# Patient Record
(demographics unavailable — no encounter records)

---

## 2018-06-05 NOTE — US
EXAMINATION TYPE: US abdomen complete

 

DATE OF EXAM: 6/5/2018

 

COMPARISON: Renal only

 

CLINICAL HISTORY: R10.33 PERIUMBILICAL PAIN. Pt states periumbilical pain, N&V

 

EXAM MEASUREMENTS:

 

Liver Length:  12.7 cm   

Gallbladder Wall:  0.2 cm   

CBD:  0.3 cm

Spleen:  8.5 cm   

Right Kidney:  10.9 x 4.1 x 4.9 cm 

Left Kidney:  10.0 x 5.1 x 4.3 cm   

 

 

 

Pancreas:  wnl

Liver:  wnl  

Gallbladder:  wnl

**Evidence for sonographic Street's sign:  No

CBD:  wnl 

Spleen:  wnl   

Right Kidney:  wnl   

Left Kidney:  wnl   

Upper IVC:  wnl  

Abd Aorta:  wnl

 

**No abnormality visualized within ABD at this time**

 

The liver is homogenous.  The intrahepatic portion of the IVC and proximal abdominal aorta are within
 normal limits.  There is no evidence of cholelithiasis.  Common bile duct is unremarkable.  The visu
alized portions of the pancreas are homogenous.  The spleen is unremarkable.  Kidneys are symmetric a
nd free of hydronephrosis.  No renal lesions are seen.

 

IMPRESSION: Normal abdomen ultrasound

## 2018-06-05 NOTE — US
EXAMINATION TYPE: US pelvic complete

 

DATE OF EXAM: 6/5/2018

 

COMPARISON: NONE

 

CLINICAL HISTORY: R10.33 PERIUMBILICAL PAIN. Generalized pain. Patient on menses.  

 

TECHNIQUE:  Transabdominal (TA).  

 

Date of LMP:  6/2/2018, G0

 

EXAM MEASUREMENTS:

 

Uterus:  6.0 x 3.7 x 3.3 cm

Endometrial Stripe: 0.2 cm

Right Ovary:  2.5 x 1.6 x 1.3 cm

Left Ovary:  2.9 x 1.4 x 1.4 cm

 

 

 

1. Uterus:  Anteverted   wnl

2. Endometrium:  wnl

3. Right Ovary:  wnl

4. Left Ovary:  wnl

5. Bilateral Adnexa:  wnl

6. Posterior cul-de-sac:  no free fluid

Cervix- wnl

 

 

 

IMPRESSION: No significant abnormalities evident

## 2019-01-17 NOTE — XR
EXAMINATION TYPE: XR chest 2V

 

DATE OF EXAM: 1/16/2019

 

COMPARISON: NONE

 

HISTORY: Cough for 1.5 months

 

TECHNIQUE:  Frontal and lateral views of the chest are obtained.

 

FINDINGS:  There is no focal air space opacity, pleural effusion, or pneumothorax seen.  The cardiac 
silhouette size is within normal limits.   The osseous structures are intact.

 

IMPRESSION:  No acute cardiopulmonary process.

## 2020-05-25 NOTE — P.MSEPDOC
Presenting Problems





- Arrival Data


Date of Arrival on Unit: 20


Time of Arrival on Unit: 15:55


Mode of Transport: Ambulatory





- Complaint


OB-Reason for Admission/Chief Complaint: Possible Onset of Labor


Comment: states started having contx at 1000, getting stronger through day now 

stronger and stronger. intercourse during that time. denies leaking fluid or 

bleeding vaginally





Prenatal Medical History





- Pregnancy Information


: 2


Para: 0


Term: 0


: 0


Abortions: Spontaneous or Elective: 1


Number of Living Children: 0





- Gestational Age


Gestational Age by JACIEL (wks/days): 35 Weeks and 4 Days





- Prenatal History


Pregnancy Complications: Smoker, Other


Comment: gbs unknown not done yet.





Review of Systems





- Review of Systems


Constitutional: No problems


Breast: No problems


ENT: No problems


Cardiovascular: No problems


Respiratory: No problems


Gastrointestinal: No problems


Genitourinary: No problems


Musculoskeletal: No problems


Neurological: No problems


Skin: No problems





Vital Signs





- Temperature


Temperature: 98.0 F


Temperature Source: Temporal Artery Scan





- Pulse


  ** Right Radial


Pulse Rate: 125


Pulse Assessment Method: Auscultation





- Respirations


Respiratory Rate: 16


Oxygen Delivery Method: Room Air


O2 Sat by Pulse Oximetry: 98





- Blood Pressure


  ** Right Arm


Blood Pressure: 104/72


Blood Pressure Mean: 82


Blood Pressure Source: Automatic Cuff





Medical Screen Scoring (Pre)





- Cervical Exam


Dilation: 1-3 cm = 1


Membranes: Intact





- Uterine Contractions


Frequency: < 36 weeks = 6


Duration: > 40 seconds = 2


Intensity: N/A





- Maternal Vital Signs


Maternal Temperature: N/A


Maternal Blood Pressure: N/A


Signs of Preeclampsia: N/A


Maternal Respirations: N/A





- Maternal Trauma


Maternal Trauma: N/A





- Fetal Assessment - Baby A


Baseline FHR: 140


Fetal Heart Rate - NICHD Category: Category I (Normal) = 0


NST: Reactive


Fetal Station: N/A





- Total Score - Baby A


Total Score - Baby A: 9





- Total Score - Baby B


Total Score - Baby B: 9





- Total Score - Baby C


Total Score - Baby C: 9





- Level of Risk - Baby A


Level of Risk - Baby A: Medium (6-9)





- Level of Risk - Baby B


Level of Risk - Baby B: Medium (6-9)





- Level of Risk - Baby C


Level of Risk - Baby C: Medium (6-9)





Physician Notification (Pre)





- Physician Notified


Physician Notified Date: 20


Physician Notified Time: 16:20


New Order Received: Yes





- Notification Comment


Comment: iv hydrate l/r. cbc and urinalsys. recheck 1 hour and call report





Disposition





- Disposition


OB Disposition: Discharge to home


I agree with the RN Medical Screening Exam: Yes


Risk & Benefit of care provided described in d/c instruction: Yes


Diagnosis:  LABOR WITHOUT DELIVERY, THIRD TRIMESTER

## 2020-05-25 NOTE — P.HPOB
History of Present Illness


H&P Date: 20


Chief Complaint: Contractions





This patient is a 23-year-old  2 para 0 female estimated date of 

confinement 2020 estimated gestational age 35-4/7 weeks which is changed 

by a 9 week ultrasound who presents to labor and delivery with complaints of 

contractions since about 10:00 this morning.  Patient states that she had 

intercourse this morning at about that time began having back pain and 

contractions thereafter.  Prenatal care is per Dr. Ferguson and it appears to be 

otherwise uncomplicated except for the history of a positive Ureaplasma culture.





Review of Systems


Genitourinary: Reports pregnant


Menstruation: Reports amenorrhea





Past Medical History


Past Medical History: GERD/Reflux


History of Any Multi-Drug Resistant Organisms: None Reported


Past Anesthesia/Blood Transfusion Reactions: No Reported Reaction


Past Psychological History: No Psychological Hx Reported


Smoking Status: Former smoker


Past Alcohol Use History: None Reported


Past Drug Use History: None Reported





Medications and Allergies


                                Home Medications











 Medication  Instructions  Recorded  Confirmed  Type


 


Citalopram Hydrobromide [CeleXA] 20 mg PO DAILY 20 History


 


Doxylamine Succinate [Unisom] 25 mg PO DAILY 20 History


 


Iron 18 mg PO DAILY 20 History


 


Omeprazole [PriLOSEC] 1 tab OP DAILY 20 History


 


Pnv,Calcium 72/Iron/Folic Acid 1 tab PO DAILY 20 History





[Prenatal Plus Tablet]    








                                    Allergies











Allergy/AdvReac Type Severity Reaction Status Date / Time


 


ibuprofen [From Motrin] Allergy  Anaphylaxis Verified 20 15:47


 


Penicillins Allergy  Nausea & Verified 20 15:47





   Vomiting  














Exam


                                   Vital Signs











  Temp Pulse Resp BP Pulse Ox


 


 20 15:59  98.0 F  125 H  16  104/72  98








                                Intake and Output











 20





 06:59 14:59 22:59


 


Other:   


 


  Weight   84.822 kg














- OBG Physical Exam


Abdomen: bowel sounds normal, no diffuse tenderness, no bruit present, no 

guarding noted, no hepatomegaly, no splenomegaly, no mass


Vulva: both: normal


Vagina: normal moisture, no discharge


Cervix: no lesion (Cervix is 2-3, soft but posterior.), no discharge


Uterus: enlarged





Results





Prenatal blood work shows she is B+, rubella immune, RPR is nonreactive, 

hepatitis B is negative, HIV is nonreactive, group B strep has not been done 

yet, Glucola was normal, ultrasounds at 32 weeks was normal


Result Diagrams: 


                                 20 16:45





                  Abnormal Lab Results - Last 24 Hours (Table)











  20 Range/Units





  16:35 16:45 


 


WBC   13.0 H  (3.8-10.6)  k/uL


 


RBC   3.59 L  (3.80-5.40)  m/uL


 


Hgb   10.8 L  (11.4-16.0)  gm/dL


 


Hct   33.5 L  (34.0-46.0)  %


 


Neutrophils #   10.3 H  (1.3-7.7)  k/uL


 


Urine Appearance  Cloudy H   (Clear)  


 


Urine Protein  1+ H   (Negative)  


 


Urine Blood  Moderate H   (Negative)  


 


Ur Leukocyte Esterase  Large H   (Negative)  


 


Urine RBC  24 H   (0-5)  /hpf


 


Urine WBC  >182 H   (0-5)  /hpf


 


Urine WBC Clumps  Few H   (None)  /hpf


 


Ur Squamous Epith Cells  16 H   (0-4)  /hpf


 


Hyaline Casts  3 H   (0-2)  /lpf


 


Urine Mucus  Many H   (None)  /hpf














Assessment and Plan


Assessment: 





This is a 23-year-old  2 para 0 female 35-4/7 weeks gestation with 

 contractions and cervical dilation.  Since this patient is less than 37 

weeks and continues to have contractions despite IV hydration, I'm going to 

admit her for Celestone therapy in the event she does go on to deliver.  If the 

patient does go into labor we will also give her IV antibiotics due to unknown 

group B strep status and prematurity.  Fetal heart tones are category 1 at this 

time.  I did do a bedside ultrasound confirms vertex presentation.  Plan is 

continued inpatient hospitalization.  I discussed this plan with the patient and

her partner and they understand and agree.


(1) 35 to 36 weeks gestation of pregnancy


Current Visit: Yes   Status: Acute   Code(s): JSI1470 -    SNOMED Code(s): 

124176233


   





(2)  contractions


Current Visit: Yes   Status: Acute   Code(s): O47.9 - FALSE LABOR, UNSPECIFIED  

SNOMED Code(s): 329129626

## 2020-05-26 NOTE — P.DS
Providers


Date of admission: 


05/25/20 17:42





Expected date of discharge: 05/26/20


Attending physician: 


Adolfo Cat





Primary care physician: 


Adolfo Cat





[facility] Course: 





Patient is doing well this morning.  Her contractions decreased significantly.  

She is requesting discharge home.  Prescription for Keflex and omeprazole are 

provided.  She'll follow up with me this week.  She is aware to maintain pelvic 

rest for next few days.  She will return this evening for a second dose of 

Celestone.  All the questions are answered for this time.  Her vital signs are 

stable and afebrile.  Heart regular, lungs clear, extremities without pain.  

Abdomen soft and few contractions are noted.  A category 1 tracing has been 

noted.


Patient Condition at Discharge: Good





Plan - Discharge Summary


New Discharge Prescriptions: 


New


   Omeprazole [PriLOSEC] 40 mg PO DAILY 30 Days #30 cap


   Cephalexin [Keflex] 500 mg PO Q6HR #20 cap





No Action


   Omeprazole [PriLOSEC] 1 tab OP DAILY


   Citalopram Hydrobromide [CeleXA] 20 mg PO DAILY


   Iron 18 mg PO DAILY


   Pnv,Calcium 72/Iron/Folic Acid [Prenatal Plus Tablet] 1 tab PO DAILY


   Doxylamine Succinate [Unisom] 25 mg PO DAILY


Discharge Medication List





Citalopram Hydrobromide [CeleXA] 20 mg PO DAILY 05/25/20 [History]


Doxylamine Succinate [Unisom] 25 mg PO DAILY 05/25/20 [History]


Iron 18 mg PO DAILY 05/25/20 [History]


Omeprazole [PriLOSEC] 1 tab OP DAILY 05/25/20 [History]


Pnv,Calcium 72/Iron/Folic Acid [Prenatal Plus Tablet] 1 tab PO DAILY 05/25/20 

[History]


Cephalexin [Keflex] 500 mg PO Q6HR #20 cap 05/26/20 [Rx]


Omeprazole [PriLOSEC] 40 mg PO DAILY 30 Days #30 cap 05/26/20 [Rx]








Follow up Appointment(s)/Referral(s): 


Samuel Ferguson DO [Doctor of Osteopathic Medicine] - 1 Week


Activity/Diet/Wound Care/Special Instructions: 


Return with any contractions, leaking of fluid or bleeding.  She will return 

tonight for a second dose of Celestone as she is only 35 weeks gestation.


Discharge Disposition: HOME SELF-CARE

## 2020-06-15 NOTE — P.HPOB
History of Present Illness


H&P Date: 06/15/20


Chief Complaint: SROM





23-year-old  presents at 30 weeks and 4 days with spontaneous rupture of 

membranes.  Her water broke at 2200 on 2020.  She presented to family 

birth 5-1/2 cm dilated, 90% effaced, and -2 station.  Fetal heart tones 140 with

moderate variability and reactive.





Review of Systems


All systems: negative


Constitutional: Denies chills, Denies fever


Eyes: denies blurred vision, denies pain


Ears, nose, mouth and throat: Denies headache, Denies sore throat


Cardiovascular: Denies chest pain, Denies shortness of breath


Respiratory: Denies cough


Gastrointestinal: Denies abdominal pain, Denies diarrhea, Denies nausea, Denies 

vomiting


Genitourinary: Denies dysuria, Denies hematuria


Musculoskeletal: Denies myalgias


Integumentary: Denies pruritus, Denies rash


Neurological: Denies numbness, Denies weakness


Psychiatric: Denies anxiety, Denies depression


Endocrine: Denies fatigue, Denies weight change





Past Medical History


Past Medical History: GERD/Reflux


Additional Past Medical History / Comment(s): Obstetric history: First pregnancy

was a spontaneous .  This is her second pregnancy she's had prenatal 

care with Dr. Mazariegos since 9 weeks gestation.  Blood type is B+, infant is 

negative, rubella immune, Pap is B-, RPR nonreactive, HIV nonreactive.


History of Any Multi-Drug Resistant Organisms: None Reported


Past Surgical History: Tonsillectomy


Additional Past Surgical History / Comment(s): wisdom teeth


Past Anesthesia/Blood Transfusion Reactions: No Reported Reaction


Past Psychological History: No Psychological Hx Reported


Smoking Status: Former smoker


Past Alcohol Use History: None Reported


Past Drug Use History: None Reported





- Past Family History


  ** Father


History Unknown: Yes


Family Medical History: Hypertension





Medications and Allergies


                                Home Medications











 Medication  Instructions  Recorded  Confirmed  Type


 


Citalopram Hydrobromide [CeleXA] 20 mg PO DAILY 05/25/20 06/15/20 History


 


Doxylamine Succinate [Unisom] 25 mg PO DAILY 05/25/20 06/15/20 History


 


Iron 18 mg PO DAILY 05/25/20 06/15/20 History


 


Pnv,Calcium 72/Iron/Folic Acid 1 tab PO DAILY 05/25/20 06/15/20 History





[Prenatal Plus Tablet]    


 


Omeprazole [PriLOSEC] 40 mg PO DAILY 30 Days #30 cap 05/26/20 06/15/20 Rx








                                    Allergies











Allergy/AdvReac Type Severity Reaction Status Date / Time


 


ibuprofen [From Motrin] Allergy  Anaphylaxis Verified 06/15/20 02:06


 


Penicillins Allergy  Nausea & Verified 06/15/20 02:06





   Vomiting  














Exam


Osteopathic Statement: *.  No significant issues noted on an osteopathic 

structural exam other than those noted in the History and Physical/Consult.


                                   Vital Signs











  Temp Pulse Resp BP Pulse Ox


 


 06/15/20 02:08  97.1 F L  106 H  18  112/76  98








                                Intake and Output











 06/14/20 06/14/20 06/15/20





 14:59 22:59 06:59


 


Other:   


 


  Weight   86.183 kg














Heart: Regular rate and rhythm


Lungs: Clear to auscultation bilaterally


Abdomen: Soft, nontender


Extremities: Negative Homans sign





Results


Result Diagrams: 


                                 06/15/20 02:35





                  Abnormal Lab Results - Last 24 Hours (Table)











  06/15/20 Range/Units





  02:35 


 


WBC  10.9 H  (3.8-10.6)  k/uL


 


RBC  3.68 L  (3.80-5.40)  m/uL


 


Hgb  11.0 L  (11.4-16.0)  gm/dL


 


Hct  33.9 L  (34.0-46.0)  %


 


Neutrophils #  7.8 H  (1.3-7.7)  k/uL














Assessment and Plan


(1) Spontaneous rupture of amniotic membranes


Current Visit: Yes   Status: Acute   Code(s): RWB6519 -    SNOMED Code(s): 

177016912


   


Plan: 





1.  Admit to family birth place


2.  Pitocin augmentation


3.  Anticipate normal vaginal delivery

## 2020-06-15 NOTE — P.PROBDLV
Vaginal Delivery Note





- .


Vaginal Delivery Note: 





Patient progressed complete and pushed with spontaneous vaginal delivery of a 

viable female  over a third-degree perineal laceration.  During deliver 

the head midline laceration was performed as the head came out from straight 

occiput anterior position.  Once baby's head was delivered gentle lateral 

traction was done to deliver the shoulders followed by the remainder the baby.  

Mouth nares were then bulb suctioned and baby was placed on mother's abdomen 

where the umbilical cord stopped pulsating for 45 seconds prior to clamping and 

cutting.  Nursery personnel was present to assume care.  Placenta was then 

delivered intact Pitocin was added to the IV.  Local anesthetic was injected 

into the third-degree perineal laceration.  Rectal exam was performed the 

initial suturing to verify no fourth degree laceration was present and I cannot 

palpate any capsular damage.  Initially 2 deep sutures were placed to 

reapproximate the tissues over the rectum.  Then the remainder was repaired in 

usual fashion of an episiotomy.  Once this was completed another rectal exam was

performed verifying good tone and no capsular involvement.  Apgar scores and 

weight are both pending but both mother and baby are currently stable following 

delivery.

## 2020-06-16 NOTE — P.PNOBGVD
Subjective





- Subjective


Principal diagnosis: Postpartum day 1


Interval history: 





Overall Damari is doing very well.  She is involuting, voiding and she is 

tolerating her diet.  She relates that the bleeding has slowed.  In discussing 

with her she relates that the pain from her laceration is not too bad but 

cramping is significantly worse.  She is questioning if she can take naproxen.  

She had such a severe ALLERGY to Motrin it didn't even occur to me to ask about 

other NSAIDs, however she relates that she takes naproxen/Aleve at home on a 

routine basis and has taken it since she was a child and had no problems.  We'll

start her on naproxen for her cramping.  Otherwise she is doing well and we'll 

continue current care with use of stool softeners with a third-degree perineal 

laceration





Objective





- Latest Vital Signs


Latest vital signs: 


                                   Vital Signs











  Temp Pulse Resp BP Pulse Ox


 


 06/16/20 04:00  98.2 F  92  18  124/64 


 


 06/16/20 00:00  98.6 F  109 H  18  118/67 


 


 06/15/20 20:00  98.4 F  96  16  127/79  98


 


 06/15/20 18:36  97.2 F L  105 H  18  115/70 


 


 06/15/20 18:15   109 H   113/67 


 


 06/15/20 17:45  97.2 F L  88  18  117/72 


 


 06/15/20 17:30  97.5 F L  108 H  18  112/68 


 


 06/15/20 17:15  97.7 F  106 H  18  127/70 


 


 06/15/20 17:00  97.6 F  111 H  18  133/64 


 


 06/15/20 16:45  97.2 F L  121 H  18  142/67 








                                Intake and Output











 06/15/20 06/16/20 06/16/20





 22:59 06:59 14:59


 


Other:   


 


  # Voids  2 














- Exam


Lungs: bilateral: normal


Chest: Normal S1, Normal S2


Extremities: Present: normal


Abdomen: Present: normal appearance, soft


Uterus: Present: normal, firm





- Labs


Labs: 


                  Abnormal Lab Results - Last 24 Hours (Table)











  06/16/20 Range/Units





  05:40 


 


WBC  15.0 H  (3.8-10.6)  k/uL


 


RBC  2.87 L  (3.80-5.40)  m/uL


 


Hgb  8.7 L D  (11.4-16.0)  gm/dL


 


Hct  26.7 L  (34.0-46.0)  %


 


Neutrophils #  11.7 H  (1.3-7.7)  k/uL

## 2020-06-17 NOTE — P.DS
Providers


Date of admission: 


06/15/20 02:26





Expected date of discharge: 06/17/20


Attending physician: 


Samuel Ferguson





Primary care physician: 


Stated None





Hospital Course: 





Patient is doing very well post partum day 2.  She is involuting, voiding and 

tolerating her diet.  She voices no complaints.  She is had a bowel movement 

this morning and she relates that she has no pain or no difficulties having a 

bowel movement.  She is aware to have is soft of stool as possible.  Next 

several days as her third-degree laceration healing all the questions are 

answered for her at this time.


On physical exam her vital signs are stable and afebrile.  Heart regular, lungs 

clear, extremities without pain.  Abdomen soft uterus is firm and lochia is 

reported light.


Assessment postpartum day 2.  Plan discharged home follow up with me in 6 weeks.

 Prescription for Norco was forwarded to her pharmacy.


Patient Condition at Discharge: Good





Plan - Discharge Summary


New Discharge Prescriptions: 


New


   HYDROcodone/APAP 5-325MG [Norco 5-325] 1 tab PO Q4HR PRN #30 tab


     PRN Reason: Pain





No Action


   Citalopram Hydrobromide [CeleXA] 20 mg PO DAILY


   Iron 18 mg PO DAILY


   Pnv,Calcium 72/Iron/Folic Acid [Prenatal Plus Tablet] 1 tab PO DAILY


   Doxylamine Succinate [Unisom] 25 mg PO DAILY


   Omeprazole [PriLOSEC] 40 mg PO DAILY 30 Days #30 cap


Discharge Medication List





Citalopram Hydrobromide [CeleXA] 20 mg PO DAILY 05/25/20 [History]


Doxylamine Succinate [Unisom] 25 mg PO DAILY 05/25/20 [History]


Iron 18 mg PO DAILY 05/25/20 [History]


Pnv,Calcium 72/Iron/Folic Acid [Prenatal Plus Tablet] 1 tab PO DAILY 05/25/20 

[History]


Omeprazole [PriLOSEC] 40 mg PO DAILY 30 Days #30 cap 05/26/20 [Rx]


HYDROcodone/APAP 5-325MG [Norco 5-325] 1 tab PO Q4HR PRN #30 tab 06/17/20 [Rx]








Follow up Appointment(s)/Referral(s): 


Samuel Ferguson DO [Doctor of Osteopathic Medicine] - 6 Weeks


Activity/Diet/Wound Care/Special Instructions: 


No heavy lifting, insertion driving and pelvic rest.  If any high temperatures, 

heavy bleeding, or severe pain call my office

## 2020-06-18 NOTE — CDI
Documentation Clarification Form

Date: 20

From: Kiera Florez

Phone: If you have a question about this query, please contact Khushbu Petty, 
 at [phone] between 8am and 5pm.

Admit Date: 6/15/20

Discharge Date: 20

Patient Name: NIESHA CARRASCO

Visit Number: [id number]



ATTENTION: The Clinical Documentation Specialists (CDI) and [facility] Coding Staff 
appreciate your assistance in clarifying documentation. Please respond to the 
clarification below the line at the bottom and electronically sign. The CDI & 
[facility] Coding staff will review the response and follow-up if needed. Please note: 
Queries are made part of the Legal Health Record. If you have any questions, 
please contact the author of this message via ITS.



Dear Dr. Samuel Ferguson,



A pregnant woman, 39 weeks gestation admitted in active labor. She had an 
episotomy with manually assisted vaginal delivery of a normal . The 
patient suffered a partial third degree laceration which was repaired.  



In your professional opinion, did the third degree laceration involve:



*Injury to perineal skin only

*Injury to perineum involving perineal muscles but not involving anal sphincter

*Injury to perineum involving anal sphincter complex

*Less than 50% of external anal sphincter (EAS) thickness torn

*More than 50% of external anal sphincter (EAS) thickness torn

*Both external anal sphincter (EAS) and internal anal sphincter (IAS) torn

*Injury to perineum involving anal sphincter complex (external anal sphincter 
(EAS) and internal anal sphincter (IAS) and anal epithelium

*Other, please specify ________

*Clinically unable to determine

___________________________________________________________________________

firstly this was NOT an episotomy. I would say injury to perineum involving 
perineal muscles but not involving anal sphincter

MTDD

## 2020-06-24 NOTE — P.MSEPDOC
Presenting Problems





- Arrival Data


Date of Arrival on Unit: 20


Time of Arrival on Unit: 18:06


Mode of Transport: Ambulatory





- Complaint


OB-Reason for Admission/Chief Complaint: Possible Onset of Labor





Prenatal Medical History





- Pregnancy Information


: 2


Para: 0


Term: 0


: 0


Abortions: Spontaneous or Elective: 1


Number of Living Children: 0





- Gestational Age


Gestational Age by JACIEL (wks/days): 38 Weeks and 1 Days





Review of Systems





- Review of Systems


Constitutional: No problems


Breast: No problems


ENT: No problems


Cardiovascular: No problems


Respiratory: No problems


Gastrointestinal: No problems


Genitourinary: No problems


Musculoskeletal: No problems


Neurological: No problems


Skin: No problems





Vital Signs





- Temperature


Temperature: 98.1 F


Temperature Source: Temporal Artery Scan





- Pulse


  ** Right


Pulse Rate: 102


Pulse Assessment Method: Automatic Cuff





- Respirations


Respiratory Rate: 16


Oxygen Delivery Method: Room Air


O2 Sat by Pulse Oximetry: 97





- Blood Pressure


  ** Right Arm


Blood Pressure: 123/80


Blood Pressure Mean: 94


Blood Pressure Source: Automatic Cuff





Medical Screen Scoring (Pre)





- Cervical Exam


Dilation: 4-7 cm = 2


Membranes: Intact





- Uterine Contractions


Frequency: > or = 36 weeks =2


Duration: > 40 seconds = 2


Intensity: Contraction palpated strong = 1





- Maternal Vital Signs


Maternal Temperature: N/A


Maternal Blood Pressure: N/A


Signs of Preeclampsia: N/A


Maternal Respirations: N/A





- Maternal Trauma


Maternal Trauma: N/A





- Fetal Assessment - Baby A


Baseline FHR: 145


Fetal Heart Rate - NICHD Category: Category I (Normal) = 0


NST: Reactive


Fetal Position: N/A


Fetal Station: N/A





- Total Score - Baby A


Total Score - Baby A: 7





- Total Score - Baby B


Total Score - Baby B: 7





- Total Score - Baby C


Total Score - Baby C: 7





- Level of Risk - Baby A


Level of Risk - Baby A: Medium (6-9)





- Level of Risk - Baby B


Level of Risk - Baby B: Medium (6-9)





- Level of Risk - Baby C


Level of Risk - Baby C: Medium (6-9)





Physician Notification (Pre)





- Physician Notified


Physician Notified Date: 20


Physician Notified Time: 18:38





Medical Screen Scoring (Post)





- Cervical Exam


Dilation: 4-7 cm = 2


Effacement: More than 50% = 2


Membranes: Intact





- Uterine Contractions


Frequency: > or = 36 weeks =2


Duration: > 40 seconds = 2


Intensity: N/A





- Maternal Vital Signs


Maternal Temperature: N/A


Maternal Blood Pressure: N/A


Signs of Preeclampsia: N/A


Maternal Respirations: N/A





- Pain Assessment


Pain Scale Used: Numeric (1 - 10)


Pain Intensity: 6


Pain Description: *Acute


Pain Aggravating Factors: Contractions





- Maternal Trauma


Maternal Trauma: N/A





- Fetal Assessment - Baby A


Fetal Heart Rate: 145


Fetal Heart Rate - NICHD Category: Category II (Indeterminate) = 3


NST: Reactive


Fetal Position: N/A


Fetal Station: N/A





- Total Score


Total Score - Baby A: 11


Total Score - Baby B: 8


Total Score - Baby C: 8





- Post Treatment Level of Risk


Post Treatment Level of Risk - Baby A: High (10+)





Physician Notification (Post)





- Physician Notified


Physician Notified Date: 20


Physician Notified Time: 19:30


Physician/Practitioner Notified:: Sriram Velasquez Order Received: Yes





- Notification Comment


Comment: DC pt home, FHR WNL, documented as a category II d/t 1 variable during 

stay. Physician aware, pt may DC home.





Disposition





- Disposition


OB Disposition: Discharge to home


Discharge Date: 20


Discharge Time: 19:40


I agree with the RN Medical Screening Exam: Yes


Physician's MSE Comment: 





Pt had category 1 FHR tracing upon discharge


Risk & Benefit of care provided described in d/c instruction: Yes


Diagnosis: FALSE LABOR BEFORE 37 COMPLETED WEEKS OF GEST, THIRD TRI

## 2021-09-21 NOTE — US
EXAMINATION TYPE: US abdomen complete

 

DATE OF EXAM: 9/21/2021

 

COMPARISON: US

 

CLINICAL HISTORY: R74.01 ELEV OF LEVELS OF LIVER TRANSAMINASE. Generalized right lateral abdomen pain
 with applied pressure'; HT 5'1, WT 210lb.

 

EXAM MEASUREMENTS:

 

Liver Length:  11.3 cm   

Gallbladder Wall:  0.2 cm   

CBD:  0.3 cm

Spleen:  9.0 cm   

Right Kidney:  10.5 x 5.2 x 4.5 cm 

Left Kidney:  10.2 x 5.0 x 4.8 cm   

 

 

 

Pancreas:  hyperechoic with tail obscured by overlying bowel gas 

Liver:  hyperechoic to right renal cortex suggests fatty liver; focal fatty sparing near gallbladder 
 

Gallbladder:  wnl

**Evidence for sonographic Street's sign: no

CBD:  wnl 

Spleen:  wnl   

Right Kidney:  No hydronephrosis or masses seen   

Left Kidney:  No hydronephrosis or masses seen   

Upper IVC:  wnl  

Abd Aorta:  wnl

 

 

 

 

IMPRESSION: 

1. Some fatty infiltration to the liver.

2. Somewhat hyperechoic pancreas. Correlate with the pancreas laboratory results.

## 2022-02-07 NOTE — ED
General Adult HPI





- General


Chief complaint: Vaginal Bleeding


Stated complaint: possible miscarriage


Time Seen by Provider: 22 19:15


Source: patient, RN notes reviewed, old records reviewed


Mode of arrival: ambulatory


Limitations: no limitations





- History of Present Illness


Initial comments: 





24-year-old female presents to the emergency room with 6 days of pelvic 

pain/cramping and bleeding.  She states she did call her OB/GYN and was told to 

come to the emergency room if she passes clots that are larger than a quarter.  

She does not remember when her last menstrual period was and states that she 

thinks she is approximately 6 weeks pregnant.  She is a . 


-: days(s) (6)


Location: pelvis


Severity scale (1-10): 3


Quality: other (cramping)


Consistency: constant


Improves with: none


Associated Symptoms: other (vaginal bleeding)


Treatments Prior to Arrival: none





- Related Data


                                Home Medications











 Medication  Instructions  Recorded  Confirmed


 


Citalopram Hydrobromide [CeleXA] 20 mg PO HS 20


 


Doxylamine Succinate [Unisom] 25 mg PO HS 20


 


Pnv,Calcium 72/Iron/Folic Acid 1 tab PO HS 20





[Prenatal Plus Tablet]   


 


Cholecalciferol [Vitamin D3 (25 50 mcg PO HS 22





Mcg = 1000 Iu)]   


 


Omeprazole [PriLOSEC] 40 mg PO HS 22








                                  Previous Rx's











 Medication  Instructions  Recorded


 


Sulfamethox-Tmp 800-160Mg [Bactrim 1 each PO Q12HR 3 Days #6 tab 22





Ds]  











                                    Allergies











Allergy/AdvReac Type Severity Reaction Status Date / Time


 


ibuprofen [From Motrin] Allergy  Anaphylaxis Verified 22 21:30


 


Penicillins Allergy  Nausea & Verified 22 21:30





   Vomiting  














Review of Systems


ROS Statement: 


Those systems with pertinent positive or pertinent negative responses have been 

documented in the HPI.





ROS Other: All systems not noted in ROS Statement are negative.





Past Medical History


Past Medical History: GERD/Reflux


Additional Past Medical History / Comment(s): Obstetric history: First pregnancy

was a spontaneous .  This is her second pregnancy she's had prenatal 

care with Dr. Mazariegos since 9 weeks gestation.  Blood type is B+, infant is 

negative, rubella immune, Pap is B-, RPR nonreactive, HIV nonreactive.


History of Any Multi-Drug Resistant Organisms: None Reported


Past Surgical History: Tonsillectomy


Additional Past Surgical History / Comment(s): wisdom teeth


Past Anesthesia/Blood Transfusion Reactions: No Reported Reaction


Past Psychological History: No Psychological Hx Reported


Smoking Status: Current every day smoker


Past Alcohol Use History: None Reported


Past Drug Use History: None Reported





- Past Family History


  ** Father


History Unknown: Yes


Family Medical History: Hypertension





General Exam


Limitations: no limitations


General appearance: alert, in no apparent distress


Eye exam: Present: normal appearance


Respiratory exam: Present: normal lung sounds bilaterally.  Absent: respiratory 

distress, wheezes, rales, rhonchi, stridor


Cardiovascular Exam: Present: regular rate


GI/Abdominal exam: Present: soft, tenderness (pelvic).  Absent: distended, 

guarding, rebound, rigid


Extremities exam: Present: normal inspection, full ROM, normal capillary refill.

 Absent: pedal edema, calf tenderness


Back exam: Present: normal inspection, full ROM.  Absent: tenderness


Neurological exam: Present: alert, oriented X3


Psychiatric exam: Present: normal affect, normal mood


Skin exam: Present: warm, dry, intact, normal color.  Absent: cyanosis, 

diaphoretic





Course


                                   Vital Signs











  22





  17:30


 


Temperature 98.7 F


 


Pulse Rate 98


 


Respiratory 20





Rate 


 


Blood Pressure 141/85


 


O2 Sat by Pulse 99





Oximetry 














Medical Decision Making





- Medical Decision Making





Patient presents to the emergency room with complaints of vaginal bleeding state

s she is 6 weeks pregnant but unknown LMP.  


Transabdominal ultrasound shows no IUP urine pregnancy and beta hCG is negative.


There is no evidence of torsion.


There is no evidence of leukocytosis hemoglobin and hematocrit are electrolytes 

are unremarkable.  Urinalysis shows cloudy urine with large blood 44 wbc's.  Her

bleeding is likely her menstrual cycle.  Her abdomen is soft and nontender.  She

'll be treated for urinary tract infection with Bactrim and directed to follow 

up with her primary care doctor.  Patient is agreeable to this plan of care.  

Vital signs are stable at discharge.


Case discussed with Dr. Cm





- Lab Data


Result diagrams: 


                                 22 19:42





                                 22 19:42


                                   Lab Results











  22 Range/Units





  19:15 19:15 19:42 


 


WBC    10.9 H  (3.8-10.6)  k/uL


 


RBC    4.31  (3.80-5.40)  m/uL


 


Hgb    13.5  (11.4-16.0)  gm/dL


 


Hct    40.7  (34.0-46.0)  %


 


MCV    94.5  (80.0-100.0)  fL


 


MCH    31.4  (25.0-35.0)  pg


 


MCHC    33.3  (31.0-37.0)  g/dL


 


RDW    12.7  (11.5-15.5)  %


 


Plt Count    282  (150-450)  k/uL


 


MPV    9.1  


 


Neutrophils %    57  %


 


Lymphocytes %    35  %


 


Monocytes %    5  %


 


Eosinophils %    2  %


 


Basophils %    1  %


 


Neutrophils #    6.2  (1.3-7.7)  k/uL


 


Lymphocytes #    3.8  (1.0-4.8)  k/uL


 


Monocytes #    0.5  (0-1.0)  k/uL


 


Eosinophils #    0.2  (0-0.7)  k/uL


 


Basophils #    0.1  (0-0.2)  k/uL


 


PT     (9.0-12.0)  sec


 


INR     (<1.2)  


 


Sodium     (137-145)  mmol/L


 


Potassium     (3.5-5.1)  mmol/L


 


Chloride     ()  mmol/L


 


Carbon Dioxide     (22-30)  mmol/L


 


Anion Gap     mmol/L


 


BUN     (7-17)  mg/dL


 


Creatinine     (0.52-1.04)  mg/dL


 


Est GFR (CKD-EPI)AfAm     (>60 ml/min/1.73 sqM)  


 


Est GFR (CKD-EPI)NonAf     (>60 ml/min/1.73 sqM)  


 


Glucose     (74-99)  mg/dL


 


Calcium     (8.4-10.2)  mg/dL


 


Total Bilirubin     (0.2-1.3)  mg/dL


 


AST     (14-36)  U/L


 


ALT     (4-34)  U/L


 


Alkaline Phosphatase     ()  U/L


 


Lactate Dehydrogenase     (313-618)  U/L


 


Total Protein     (6.3-8.2)  g/dL


 


Albumin     (3.5-5.0)  g/dL


 


HCG, Quant     mIU/mL


 


Urine Color  Light Red    


 


Urine Appearance  Cloudy H    (Clear)  


 


Urine pH  6.5    (5.0-8.0)  


 


Ur Specific Gravity  1.018    (1.001-1.035)  


 


Urine Protein  1+ H    (Negative)  


 


Urine Glucose (UA)  Negative    (Negative)  


 


Urine Ketones  Negative    (Negative)  


 


Urine Blood  Large H    (Negative)  


 


Urine Nitrite  Negative    (Negative)  


 


Urine Bilirubin  Negative    (Negative)  


 


Urine Urobilinogen  <2.0    (<2.0)  mg/dL


 


Ur Leukocyte Esterase  Small H    (Negative)  


 


Urine RBC  >182 H    (0-5)  /hpf


 


Urine WBC  44 H    (0-5)  /hpf


 


Ur Squamous Epith Cells  4    (0-4)  /hpf


 


Urine Bacteria  Rare H    (None)  /hpf


 


Urine Mucus  Occasional H    (None)  /hpf


 


Urine HCG, Qual   Not Detected   (Not Detectd)  














  22 Range/Units





  19:42 19:42 


 


WBC    (3.8-10.6)  k/uL


 


RBC    (3.80-5.40)  m/uL


 


Hgb    (11.4-16.0)  gm/dL


 


Hct    (34.0-46.0)  %


 


MCV    (80.0-100.0)  fL


 


MCH    (25.0-35.0)  pg


 


MCHC    (31.0-37.0)  g/dL


 


RDW    (11.5-15.5)  %


 


Plt Count    (150-450)  k/uL


 


MPV    


 


Neutrophils %    %


 


Lymphocytes %    %


 


Monocytes %    %


 


Eosinophils %    %


 


Basophils %    %


 


Neutrophils #    (1.3-7.7)  k/uL


 


Lymphocytes #    (1.0-4.8)  k/uL


 


Monocytes #    (0-1.0)  k/uL


 


Eosinophils #    (0-0.7)  k/uL


 


Basophils #    (0-0.2)  k/uL


 


PT  10.3   (9.0-12.0)  sec


 


INR  0.9   (<1.2)  


 


Sodium   137  (137-145)  mmol/L


 


Potassium   4.2  (3.5-5.1)  mmol/L


 


Chloride   104  ()  mmol/L


 


Carbon Dioxide   25  (22-30)  mmol/L


 


Anion Gap   8  mmol/L


 


BUN   10  (7-17)  mg/dL


 


Creatinine   0.74  (0.52-1.04)  mg/dL


 


Est GFR (CKD-EPI)AfAm   >90  (>60 ml/min/1.73 sqM)  


 


Est GFR (CKD-EPI)NonAf   >90  (>60 ml/min/1.73 sqM)  


 


Glucose   100 H  (74-99)  mg/dL


 


Calcium   9.0  (8.4-10.2)  mg/dL


 


Total Bilirubin   0.3  (0.2-1.3)  mg/dL


 


AST   32  (14-36)  U/L


 


ALT   33  (4-34)  U/L


 


Alkaline Phosphatase   119  ()  U/L


 


Lactate Dehydrogenase   541  (313-618)  U/L


 


Total Protein   7.3  (6.3-8.2)  g/dL


 


Albumin   4.2  (3.5-5.0)  g/dL


 


HCG, Quant   <2.4  mIU/mL


 


Urine Color    


 


Urine Appearance    (Clear)  


 


Urine pH    (5.0-8.0)  


 


Ur Specific Gravity    (1.001-1.035)  


 


Urine Protein    (Negative)  


 


Urine Glucose (UA)    (Negative)  


 


Urine Ketones    (Negative)  


 


Urine Blood    (Negative)  


 


Urine Nitrite    (Negative)  


 


Urine Bilirubin    (Negative)  


 


Urine Urobilinogen    (<2.0)  mg/dL


 


Ur Leukocyte Esterase    (Negative)  


 


Urine RBC    (0-5)  /hpf


 


Urine WBC    (0-5)  /hpf


 


Ur Squamous Epith Cells    (0-4)  /hpf


 


Urine Bacteria    (None)  /hpf


 


Urine Mucus    (None)  /hpf


 


Urine HCG, Qual    (Not Detectd)  














Disposition


Clinical Impression: 


 UTI (urinary tract infection)





Disposition: HOME SELF-CARE


Condition: Good


Instructions (If sedation given, give patient instructions):  Urinary Tract 

Infection in Women (ED)


Additional Instructions: 


Increase your fluid intake and take the antibiotics as prescribed.  Follow-up 

with your primary care doctor this week.  Return to the emergency room with any 

new or concerning symptoms.


Prescriptions: 


Sulfamethox-Tmp 800-160Mg [Bactrim Ds] 1 each PO Q12HR 3 Days #6 tab


Is patient prescribed a controlled substance at d/c from ED?: No


Referrals: 


Pepe Schaffer DO [Primary Care Provider] - 1-2 days


Time of Disposition: 21:19

## 2022-02-07 NOTE — US
EXAMINATION TYPE: Transabdominal

 

DATE OF EXAM: 2022 8:27 PM

 

COMPARISON: NONE

 

CLINICAL HISTORY: pregnant vaginal bleeding r/o ectopic. Vaginal bleeding, rule out ectopic and torsi
on. LMP 2 years ago. . LMP 2 years ago per patient, per patient no pelvic conditions. 

 

EXAM PERFORMED:  Transvaginal (TV) and Transabdominal (TA). *Include rule out torsion per Riske NP.

 

EXAM MEASUREMENTS:

 

GESTATIONAL AGE / DATING

 

Physician Established: Not yet established. 

Dates by LMP:  Unknown.  

Dates by First Scan:  This is first scan. 

Dates by Current Scan for: No IUP seen at this time.

 

MATERNAL ANATOMY 

 

Uterus: 7.5 x 5.1 x 4.0 cm. Endometrium measured at 0.83 cm. Subcentimeter anechoic area seen cervix.


Right Ovary: 3.2 x 2.2 x 2.4 cm. Subcentimeter anechoic areas seen.

Left Ovary: 3.5 x 1.8 x 1.8 cm. Slightly limited evaluation. Positioned posterior to the uterus. Anec
hoic area seen: 1.7 x 1.4 x 0.7 cm.

Post CDS / Adnexa: Minimal fluid seen in CDS.

Presence of free fluid: Minimal in CDS.

Presence of corpus luteal cyst: not seen.

 

 

GESTATION / FETAL SURVEY

 

IUP:  No IUP seen at this time

 

Date of LMP: 2 years ago.

Beta HcG (if available):  urine hCG not available at start of US, not detected.

 

 

IMPRESSION:

No IUP seen at this time correlate with Beta HcG.